# Patient Record
Sex: FEMALE | Race: BLACK OR AFRICAN AMERICAN | NOT HISPANIC OR LATINO | Employment: FULL TIME | URBAN - METROPOLITAN AREA
[De-identification: names, ages, dates, MRNs, and addresses within clinical notes are randomized per-mention and may not be internally consistent; named-entity substitution may affect disease eponyms.]

---

## 2018-06-01 ENCOUNTER — DOCUMENTATION ONLY (OUTPATIENT)
Dept: BARIATRICS | Facility: CLINIC | Age: 23
End: 2018-06-01

## 2018-11-30 NOTE — PROGRESS NOTES
Bariatric Surgery Online Course Form Submission  Someone has submitted a Bariatric Surgery Online Course Form Submission on this page.  Date: 11- 14:14:51  Patient's Name: Jorden Padilla  YOB: 1995 Email: [email or web address]  Phone: [phone]   Patient Address: [address] [place] [address]  Preferred Surgical Location: [facility]   I certify that I am 18 years of age or older: Yes   Confirmation Code: [id number]  Verification of Bariatric Seminar: yes  Insurance Information  Insurance or Self Pay? Insurance - Fill out fields below  Insurance Company Name: Blue Cross Blue Shield   Type of Coverage/Coverage Plan (i.e. PPO, HMO): HMO   Group Name:   Subscriber Name:   Member Name (patient's name): Jorden Padilla   Member ID/Policy #:[id number]   Plan Effective Date:   Card Issuance date: 05/08/2018

## 2018-11-30 NOTE — PROGRESS NOTES
Bariatric Surgery Online Course Form Submission  Someone has submitted a Bariatric Surgery Online Course Form Submission on this page.  Date: 11- 14:14:51  Patient's Name: Jorden Padilla  YOB: 1995 Email: [email or web address]  Phone: [phone]   Patient Address: [address] [place] [address]  Preferred Surgical Location: [facility]   I certify that I am 18 years of age or older: Yes   Confirmation Code: [id number]  Verification of Bariatric Seminar: yes  Insurance Information  Insurance or Self Pay? Insurance - Fill out fields below  Insurance Company Name: Blue Cross Blue Shield   Type of Coverage/Coverage Plan (i.e. PPO, HMO): HMO   Group Name:   Subscriber Name:   Member Name (patient's name): Jorden Padilla   Member ID/Policy #:[id number]   Plan Effective Date:   Card Issuance date: 05/08/2018

## 2018-11-30 NOTE — PROGRESS NOTES
Bariatric Surgery Online Course Form Submission  Someone has submitted a Bariatric Surgery Online Course Form Submission on this page.  Date: 11- 14:14:51  Patient's Name: Jorden Padilla  YOB: 1995 Email: [email or web address]  Phone: [phone]   Patient Address: [address] [place] [address]  Preferred Surgical Location: [facility]   I certify that I am 18 years of age or older: Yes   Confirmation Code: [id number]  Verification of Bariatric Seminar: yes  Insurance Information  Insurance or Self Pay? Insurance - Fill out fields below  Insurance Company Name: Blue Cross Blue Shield   Type of Coverage/Coverage Plan (i.e. PPO, HMO): HMO   Group Name:   Subscriber Name:   Member Name (patient's name): Jorden Padilla   Member ID/Policy #:[id number]   Plan Effective Date:   Card Issuance date: 05/08/2018

## 2018-12-04 NOTE — PROGRESS NOTES
Rachael Lei RN   Cc: MARIA TERESA Cottrell Staff             Patient does not have bariatric coverage, she's scheduled with [facility] today 11/30/2018 @ 11:30. The appointment was scheduled 11/29/2018 @ 2:34pm.   I will try reaching her prior to arrival.   Thanks    Previous Messages      ----- Message -----   From: Anna Marie Lei RN   Sent: 11/29/2018  10:14 PM   To: Rachael Dallas     Please verify bariatric benefits     Bariatric Surgery Online Course Form Submission   Someone has submitted a Bariatric Surgery Online Course Form Submission on this page.   Date: 11- 14:14:51   Patient's Name: Jorden Padilla   YOB: 1995 Email: [email or web address]   Phone: [phone]   Patient Address: [address] [place] [address]   Preferred Surgical Location: [facility]   I certify that I am 18 years of age or older: Yes   Confirmation Code: [id number]   Verification of Bariatric Seminar: yes   Insurance Information   Insurance or Self Pay? Insurance - Fill out fields below   Insurance Company Name: Blue Cross Blue Shield   Type of Coverage/Coverage Plan (i.e. PPO, HMO): HMO   Group Name:   Subscriber Name:   Member Name (patient's name): Jorden Padilla   Member ID/Policy #:[id number]   Plan Effective Date:   Card Issuance date: 05/08/2018

## 2018-12-04 NOTE — PROGRESS NOTES
Per Guarantor note, Rachael Dallas, , spoke with patient.  Notified her of no bariatric benefits and patient is not interested in self pay.

## 2018-12-05 NOTE — PROGRESS NOTES
Bariatric Surgery Online Course Form Submission  Someone has submitted a Bariatric Surgery Online Course Form Submission on this page.  Date: 11- 14:14:51  Patient's Name: Jorden Padilla  YOB: 1995 Email: [email or web address]  Phone: [phone]   Patient Address: [address] [place] [address]  Preferred Surgical Location: [facility]   I certify that I am 18 years of age or older: Yes   Confirmation Code: [id number]  Verification of Bariatric Seminar: yes  Insurance Information  Insurance or Self Pay? Insurance - Fill out fields below  Insurance Company Name: Blue Cross Blue Shield   Type of Coverage/Coverage Plan (i.e. PPO, HMO): HMO   Group Name:   Subscriber Name:   Member Name (patient's name): Jorden Padilla   Member ID/Policy #:[id number]   Plan Effective Date:   Card Issuance date: 05/08/2018

## 2024-12-04 NOTE — PROGRESS NOTES
Bariatric Surgery Online Course Form Submission  Someone has submitted a Bariatric Surgery Online Course Form Submission on this page.  Date: 2018-05-31 - 16:12  Patient's Name: Jorden Padilla  YOB: 1992 Email: [email or web address]  Phone: [phone]   Patient Address: [address] [place] [address]  Preferred Surgical Location: [facility]   I certify that I am 18 years of age or older  Confirmation Code: [id number]  Verification of Bariatric Seminar: y  Insurance Information  Insurance or Self Pay? Insurance - Fill out fields below  Insurance Company Name: [facility]   Type of Coverage/Coverage Plan (i.e. PPO, HMO): HMO   Group Name: [id number]   Subscriber Name: Jorden Padilla   Member Name (patient's name)   Member ID/Policy #:[id number]   Plan Effective Date:   Card Issuance date: 05/08/2018    Start Augmentin twice daily for 1 week for your ongoing sinus symptoms    Continue with saline rinses, astelin nasal spray and plain mucinex as needed for ongoing symptoms    If not improving call for referral to ENT